# Patient Record
Sex: MALE | Race: WHITE | HISPANIC OR LATINO | Employment: FULL TIME | URBAN - NONMETROPOLITAN AREA
[De-identification: names, ages, dates, MRNs, and addresses within clinical notes are randomized per-mention and may not be internally consistent; named-entity substitution may affect disease eponyms.]

---

## 2023-05-12 ENCOUNTER — HOSPITAL ENCOUNTER (EMERGENCY)
Facility: HOSPITAL | Age: 28
Discharge: HOME | End: 2023-05-13
Attending: EMERGENCY MEDICINE
Payer: COMMERCIAL

## 2023-05-12 DIAGNOSIS — J03.90 EXUDATIVE TONSILLITIS: Primary | ICD-10-CM

## 2023-05-12 PROCEDURE — 99284 EMERGENCY DEPT VISIT MOD MDM: CPT

## 2023-05-12 PROCEDURE — 87081 CULTURE SCREEN ONLY: CPT | Performed by: EMERGENCY MEDICINE

## 2023-05-12 PROCEDURE — 87636 SARSCOV2 & INF A&B AMP PRB: CPT | Performed by: EMERGENCY MEDICINE

## 2023-05-12 PROCEDURE — 87880 STREP A ASSAY W/OPTIC: CPT | Performed by: EMERGENCY MEDICINE

## 2023-05-13 VITALS
DIASTOLIC BLOOD PRESSURE: 61 MMHG | WEIGHT: 204.2 LBS | HEART RATE: 124 BPM | TEMPERATURE: 101.4 F | OXYGEN SATURATION: 98 % | BODY MASS INDEX: 30.24 KG/M2 | HEIGHT: 69 IN | RESPIRATION RATE: 18 BRPM | SYSTOLIC BLOOD PRESSURE: 121 MMHG

## 2023-05-13 LAB
ALBUMIN SERPL-MCNC: 4 G/DL (ref 3.5–5.2)
ALBUMIN/GLOB SERPL: 1.4 G/DL
ALP SERPL-CCNC: 89 U/L (ref 39–117)
ALT SERPL W P-5'-P-CCNC: 20 U/L (ref 1–41)
ANION GAP SERPL CALCULATED.3IONS-SCNC: 12.7 MMOL/L (ref 5–15)
AST SERPL-CCNC: 23 U/L (ref 1–40)
BASOPHILS # BLD AUTO: 0.02 10*3/MM3 (ref 0–0.2)
BASOPHILS NFR BLD AUTO: 0.2 % (ref 0–1.5)
BILIRUB SERPL-MCNC: 0.5 MG/DL (ref 0–1.2)
BUN SERPL-MCNC: 12 MG/DL (ref 6–20)
BUN/CREAT SERPL: 11.3 (ref 7–25)
CALCIUM SPEC-SCNC: 8.5 MG/DL (ref 8.6–10.5)
CHLORIDE SERPL-SCNC: 101 MMOL/L (ref 98–107)
CO2 SERPL-SCNC: 22.3 MMOL/L (ref 22–29)
CREAT SERPL-MCNC: 1.06 MG/DL (ref 0.76–1.27)
DEPRECATED RDW RBC AUTO: 42.5 FL (ref 37–54)
EGFRCR SERPLBLD CKD-EPI 2021: 99.3 ML/MIN/1.73
EOSINOPHIL # BLD AUTO: 0.01 10*3/MM3 (ref 0–0.4)
EOSINOPHIL NFR BLD AUTO: 0.1 % (ref 0.3–6.2)
ERYTHROCYTE [DISTWIDTH] IN BLOOD BY AUTOMATED COUNT: 13 % (ref 12.3–15.4)
FLUAV SUBTYP SPEC NAA+PROBE: NOT DETECTED
FLUBV RNA ISLT QL NAA+PROBE: NOT DETECTED
GLOBULIN UR ELPH-MCNC: 2.9 GM/DL
GLUCOSE SERPL-MCNC: 129 MG/DL (ref 65–99)
HCT VFR BLD AUTO: 38.3 % (ref 37.5–51)
HETEROPH AB SER QL LA: NEGATIVE
HGB BLD-MCNC: 13.1 G/DL (ref 13–17.7)
IMM GRANULOCYTES # BLD AUTO: 0.02 10*3/MM3 (ref 0–0.05)
IMM GRANULOCYTES NFR BLD AUTO: 0.2 % (ref 0–0.5)
LYMPHOCYTES # BLD AUTO: 0.98 10*3/MM3 (ref 0.7–3.1)
LYMPHOCYTES NFR BLD AUTO: 9.8 % (ref 19.6–45.3)
MCH RBC QN AUTO: 30.6 PG (ref 26.6–33)
MCHC RBC AUTO-ENTMCNC: 34.2 G/DL (ref 31.5–35.7)
MCV RBC AUTO: 89.5 FL (ref 79–97)
MONOCYTES # BLD AUTO: 0.76 10*3/MM3 (ref 0.1–0.9)
MONOCYTES NFR BLD AUTO: 7.6 % (ref 5–12)
NEUTROPHILS NFR BLD AUTO: 8.23 10*3/MM3 (ref 1.7–7)
NEUTROPHILS NFR BLD AUTO: 82.1 % (ref 42.7–76)
NRBC BLD AUTO-RTO: 0 /100 WBC (ref 0–0.2)
PLATELET # BLD AUTO: 114 10*3/MM3 (ref 140–450)
PMV BLD AUTO: 11.2 FL (ref 6–12)
POTASSIUM SERPL-SCNC: 3.5 MMOL/L (ref 3.5–5.2)
PROT SERPL-MCNC: 6.9 G/DL (ref 6–8.5)
RBC # BLD AUTO: 4.28 10*6/MM3 (ref 4.14–5.8)
S PYO AG THROAT QL: NEGATIVE
SARS-COV-2 RNA RESP QL NAA+PROBE: NOT DETECTED
SODIUM SERPL-SCNC: 136 MMOL/L (ref 136–145)
WBC NRBC COR # BLD: 10.02 10*3/MM3 (ref 3.4–10.8)

## 2023-05-13 PROCEDURE — 80053 COMPREHEN METABOLIC PANEL: CPT | Performed by: PHYSICIAN ASSISTANT

## 2023-05-13 PROCEDURE — 85025 COMPLETE CBC W/AUTO DIFF WBC: CPT | Performed by: PHYSICIAN ASSISTANT

## 2023-05-13 PROCEDURE — 86308 HETEROPHILE ANTIBODY SCREEN: CPT | Performed by: PHYSICIAN ASSISTANT

## 2023-05-13 RX ORDER — CEPHALEXIN 500 MG/1
500 CAPSULE ORAL 4 TIMES DAILY
Qty: 28 CAPSULE | Refills: 0 | Status: SHIPPED | OUTPATIENT
Start: 2023-05-13 | End: 2023-05-20

## 2023-05-13 RX ORDER — IBUPROFEN 800 MG/1
800 TABLET ORAL ONCE
Status: COMPLETED | OUTPATIENT
Start: 2023-05-13 | End: 2023-05-13

## 2023-05-13 RX ORDER — DIPHENHYDRAMINE HYDROCHLORIDE AND LIDOCAINE HYDROCHLORIDE AND ALUMINUM HYDROXIDE AND MAGNESIUM HYDRO
5 KIT ONCE
Status: COMPLETED | OUTPATIENT
Start: 2023-05-13 | End: 2023-05-13

## 2023-05-13 RX ORDER — CEPHALEXIN 250 MG/1
500 CAPSULE ORAL ONCE
Status: COMPLETED | OUTPATIENT
Start: 2023-05-13 | End: 2023-05-13

## 2023-05-13 RX ORDER — ACETAMINOPHEN 500 MG
1000 TABLET ORAL ONCE
Status: DISCONTINUED | OUTPATIENT
Start: 2023-05-13 | End: 2023-05-13 | Stop reason: HOSPADM

## 2023-05-13 RX ORDER — ACETAMINOPHEN 500 MG
TABLET ORAL
Status: DISCONTINUED
Start: 2023-05-13 | End: 2023-05-13 | Stop reason: HOSPADM

## 2023-05-13 RX ADMIN — IBUPROFEN 800 MG: 800 TABLET ORAL at 00:42

## 2023-05-13 RX ADMIN — SODIUM CHLORIDE 1000 ML: 9 INJECTION, SOLUTION INTRAVENOUS at 00:37

## 2023-05-13 RX ADMIN — DIPHENHYDRAMINE HYDROCHLORIDE AND LIDOCAINE HYDROCHLORIDE AND ALUMINUM HYDROXIDE AND MAGNESIUM HYDRO 5 ML: KIT at 00:37

## 2023-05-13 RX ADMIN — CEPHALEXIN 500 MG: 250 CAPSULE ORAL at 01:37

## 2023-05-13 NOTE — DISCHARGE INSTRUCTIONS
Take antibiotic as prescribed. Follow up with your PCP for further outpatient evaluation if symptoms persist. Take Tylenol, 1g ever 4 hours, and Motrin 600mg every 6-8 hours as needed for fever and symptomatic treatment. Use over-the-counter chloraseptic spray per directions on the package. Drink plenty of fluids. Rest. Follow up with your PCP for further outpatient evaluation if symptoms persist. Return to the ER for new or worsening symptoms or acute concerns.

## 2023-05-13 NOTE — ED PROVIDER NOTES
"Subjective:  Chief Complaint:  Sore throat    History of Present Illness:  Patient is a 27-year-old male with no significant medical history presenting to the ER with complaints of fever, sore throat, and headache over the last 2 days.  Patient's significant other is at bedside and interpreted for the patient as he is [place]-speaking.  She states that his tonsils are huge and have what looks to be pus on them.  She states that they have had difficulty getting the fever under control.  She states she made him take a cool shower and he has had Tylenol and Motrin prior to arrival.  Motrin was given around 6 PM around 6 and half hours ago.  Tylenol was given 2 hours prior to arrival.  She denies any additional symptoms or complaints at this time.  Per nursing staff, family has been sick at home as well.  Patient significant other states they have a little girl at home.      Nurses Notes reviewed and agree, including vitals, allergies, social history and prior medical history.     REVIEW OF SYSTEMS: All systems reviewed and not pertinent unless noted.  Review of Systems   Constitutional: Positive for fever.   HENT: Positive for sore throat.    Neurological: Positive for headaches.   All other systems reviewed and are negative.      History reviewed. No pertinent past medical history.    Allergies:    Patient has no known allergies.      No past surgical history on file.      Social History     Socioeconomic History   • Marital status: Single         History reviewed. No pertinent family history.    Objective  Physical Exam:  /61 (BP Location: Left arm, Patient Position: Sitting)   Pulse (!) 124   Temp (!) 101.4 °F (38.6 °C) (Oral)   Resp 18   Ht 175.3 cm (69\")   Wt 92.6 kg (204 lb 3.2 oz)   SpO2 98%   BMI 30.16 kg/m²      Physical Exam  Vitals and nursing note reviewed.   Constitutional:       General: He is not in acute distress.     Appearance: He is not toxic-appearing.   HENT:      Head: Normocephalic " and atraumatic.      Right Ear: External ear normal.      Left Ear: External ear normal.      Nose: Nose normal.      Mouth/Throat:      Comments: Bilateral exudative tonsillitis, uvula midline, no signs of peritonsillar abscess  Eyes:      Extraocular Movements: Extraocular movements intact.      Conjunctiva/sclera: Conjunctivae normal.   Cardiovascular:      Rate and Rhythm: Tachycardia present.      Heart sounds: Normal heart sounds.   Pulmonary:      Effort: Pulmonary effort is normal. No respiratory distress.      Breath sounds: Normal breath sounds.   Abdominal:      General: There is no distension.      Palpations: Abdomen is soft.   Musculoskeletal:         General: Normal range of motion.      Cervical back: Normal range of motion and neck supple.   Skin:     General: Skin is warm and dry.   Neurological:      General: No focal deficit present.      Mental Status: He is alert and oriented to person, place, and time.   Psychiatric:         Mood and Affect: Mood normal.         Behavior: Behavior normal.         Procedures    ED Course:         Lab Results (last 24 hours)     Procedure Component Value Units Date/Time    COVID-19 and FLU A/B PCR - Swab, Nasopharynx [[id number]]  (Normal) Collected: 05/12/23 2357    Specimen: Swab from Nasopharynx Updated: 05/13/23 0030     COVID19 Not Detected     Influenza A PCR Not Detected     Influenza B PCR Not Detected    Narrative:      Fact sheet for providers: https://www.fda.gov/media/064872/download    Fact sheet for patients: https://www.fda.gov/media/243556/download    Test performed by PCR.    Rapid Strep A Screen - Swab, Throat [[id number]]  (Normal) Collected: 05/12/23 2357    Specimen: Swab from Throat Updated: 05/13/23 0007     Strep A Ag Negative    Beta Strep Culture, Throat - Swab, Throat [[id number]] Collected: 05/12/23 2357    Specimen: Swab from Throat Updated: 05/13/23 0007    Mononucleosis Screen [[id number]]  (Normal) Collected: 05/13/23 0037     Specimen: Blood Updated: 05/13/23 0124     Monospot Negative    CBC & Differential [[id number]]  (Abnormal) Collected: 05/13/23 0037    Specimen: Blood Updated: 05/13/23 0049    Narrative:      The following orders were created for panel order CBC & Differential.  Procedure                               Abnormality         Status                     ---------                               -----------         ------                     CBC Auto Differential[[id number]]        Abnormal            Final result               Scan Slide[[id number]]                                                                    Please view results for these tests on the individual orders.    Comprehensive Metabolic Panel [[id number]]  (Abnormal) Collected: 05/13/23 0037    Specimen: Blood Updated: 05/13/23 0058     Glucose 129 mg/dL      BUN 12 mg/dL      Creatinine 1.06 mg/dL      Sodium 136 mmol/L      Potassium 3.5 mmol/L      Chloride 101 mmol/L      CO2 22.3 mmol/L      Calcium 8.5 mg/dL      Total Protein 6.9 g/dL      Albumin 4.0 g/dL      ALT (SGPT) 20 U/L      AST (SGOT) 23 U/L      Alkaline Phosphatase 89 U/L      Total Bilirubin 0.5 mg/dL      Globulin 2.9 gm/dL      A/G Ratio 1.4 g/dL      BUN/Creatinine Ratio 11.3     Anion Gap 12.7 mmol/L      eGFR 99.3 mL/min/1.73     Narrative:      GFR Normal >60  Chronic Kidney Disease <60  Kidney Failure <15      CBC Auto Differential [[id number]]  (Abnormal) Collected: 05/13/23 0037    Specimen: Blood Updated: 05/13/23 0049     WBC 10.02 10*3/mm3      RBC 4.28 10*6/mm3      Hemoglobin 13.1 g/dL      Hematocrit 38.3 %      MCV 89.5 fL      MCH 30.6 pg      MCHC 34.2 g/dL      RDW 13.0 %      RDW-SD 42.5 fl      MPV 11.2 fL      Platelets 114 10*3/mm3      Neutrophil % 82.1 %      Lymphocyte % 9.8 %      Monocyte % 7.6 %      Eosinophil % 0.1 %      Basophil % 0.2 %      Immature Grans % 0.2 %      Neutrophils, Absolute 8.23 10*3/mm3      Lymphocytes, Absolute 0.98 10*3/mm3       Monocytes, Absolute 0.76 10*3/mm3      Eosinophils, Absolute 0.01 10*3/mm3      Basophils, Absolute 0.02 10*3/mm3      Immature Grans, Absolute 0.02 10*3/mm3      nRBC 0.0 /100 WBC            No radiology results from the last 24 hrs       MDM  Patient was evaluated in the ER for sore throat, headache, fever x2 days.  Patient is febrile and tachycardic upon arrival but otherwise hemodynamically stable nontoxic-appearing on exam.  Differential diagnosis includes was not limited to viral illness, bacterial pharyngitis, COVID/flu, strep pharyngitis, mononucleosis, among others.  Initial plan includes CBC, CMP, Monospot test, strep swab, COVID/flu swab, and treatment with Magic mouthwash and IV fluids as well as Motrin.    Labs are unremarkable.  Mono, COVID, flu, and strep are negative.  Throat culture pending.  Given that patient does have exudative tonsillitis, will treat with Keflex with first dose given in the ER.  Patient was advised on supportive care with Motrin, Tylenol, rest, fluids.  He was advised to follow-up with his PCP for further outpatient evaluation if symptoms persist.  Precautions were given for return to the ER for any new or worsening symptoms.    Final diagnoses:   Exudative tonsillitis        Arlette Knowles, STEVE  05/13/23 0132

## 2023-05-14 ENCOUNTER — HOSPITAL ENCOUNTER (EMERGENCY)
Facility: HOSPITAL | Age: 28
Discharge: HOME | End: 2023-05-14
Attending: EMERGENCY MEDICINE | Admitting: EMERGENCY MEDICINE
Payer: COMMERCIAL

## 2023-05-14 VITALS
BODY MASS INDEX: 30.21 KG/M2 | HEART RATE: 110 BPM | TEMPERATURE: 101.8 F | SYSTOLIC BLOOD PRESSURE: 112 MMHG | DIASTOLIC BLOOD PRESSURE: 77 MMHG | RESPIRATION RATE: 18 BRPM | OXYGEN SATURATION: 97 % | WEIGHT: 204 LBS | HEIGHT: 69 IN

## 2023-05-14 DIAGNOSIS — J03.90 EXUDATIVE TONSILLITIS: Primary | ICD-10-CM

## 2023-05-14 LAB — S PYO AG THROAT QL: NEGATIVE

## 2023-05-14 PROCEDURE — 99283 EMERGENCY DEPT VISIT LOW MDM: CPT

## 2023-05-14 PROCEDURE — 87880 STREP A ASSAY W/OPTIC: CPT | Performed by: EMERGENCY MEDICINE

## 2023-05-14 PROCEDURE — 87081 CULTURE SCREEN ONLY: CPT | Performed by: EMERGENCY MEDICINE

## 2023-05-14 RX ORDER — CEPHALEXIN 500 MG/1
500 CAPSULE ORAL 4 TIMES DAILY
Qty: 28 CAPSULE | Refills: 0 | Status: SHIPPED | OUTPATIENT
Start: 2023-05-14 | End: 2023-05-21

## 2023-05-14 RX ORDER — ACETAMINOPHEN 325 MG/1
650 TABLET ORAL ONCE
Status: COMPLETED | OUTPATIENT
Start: 2023-05-14 | End: 2023-05-14

## 2023-05-14 RX ADMIN — ACETAMINOPHEN 650 MG: 325 TABLET, FILM COATED ORAL at 09:23

## 2023-05-14 NOTE — ED PROVIDER NOTES
"Subjective  History of Present Illness:    Chief Complaint: Sore throat  History of Present Illness: 27-year-old male presents with a sore throat, seen in the emergency department yesterday, had blood work, strep swab, and tested for mononucleosis, all results negative, he was discharged home with Keflex for exudative tonsillitis, the pharmacy did not have the prescription although it was electronically sent.  Onset: Gradual onset  Duration: Several days  Exacerbating / Alleviating factors: Pain with swallowing  Associated symptoms: Fever      Nurses Notes reviewed and agree, including vitals, allergies, social history and prior medical history.     REVIEW OF SYSTEMS: All systems reviewed and not pertinent unless noted.    Review of Systems   HENT: Positive for sore throat.    All other systems reviewed and are negative.      History reviewed. No pertinent past medical history.    Allergies:    Patient has no known allergies.      History reviewed. No pertinent surgical history.      Social History     Socioeconomic History   • Marital status: Single         History reviewed. No pertinent family history.    Objective  Physical Exam:  /73 (BP Location: Left arm, Patient Position: Sitting)   Pulse 111   Temp (!) 101.8 °F (38.8 °C) (Oral)   Resp 22   Ht 175.3 cm (69\")   Wt 92.5 kg (204 lb)   SpO2 96%   BMI 30.13 kg/m²      Physical Exam  Vitals and nursing note reviewed.   Constitutional:       Appearance: He is well-developed.   HENT:      Head: Normocephalic and atraumatic.      Mouth/Throat:      Pharynx: Pharyngeal swelling and posterior oropharyngeal erythema present.      Tonsils: Tonsillar exudate present. No tonsillar abscesses. 2+ on the right. 2+ on the left.   Eyes:      Extraocular Movements: Extraocular movements intact.   Cardiovascular:      Rate and Rhythm: Regular rhythm. Tachycardia present.   Pulmonary:      Effort: Pulmonary effort is normal.      Breath sounds: Normal breath sounds. "   Abdominal:      Palpations: Abdomen is soft.   Musculoskeletal:         General: Normal range of motion.      Cervical back: Normal range of motion.   Skin:     General: Skin is warm and dry.   Neurological:      Mental Status: He is alert and oriented to person, place, and time.   Psychiatric:         Behavior: Behavior normal.         Thought Content: Thought content normal.         Judgment: Judgment normal.           Procedures    ED Course:         Lab Results (last 24 hours)     Procedure Component Value Units Date/Time    Rapid Strep A Screen - Swab, Throat [[id number]]  (Normal) Collected: 05/14/23 0848    Specimen: Swab from Throat Updated: 05/14/23 0910     Strep A Ag Negative    Beta Strep Culture, Throat - Swab, Throat [[id number]] Collected: 05/14/23 0848    Specimen: Swab from Throat Updated: 05/14/23 0909           No radiology results from the last 24 hrs       Medical Decision Making  27-year-old male presents with a sore throat, seen in the emergency department yesterday, had blood work, mono, and strep all negative.  He was sent Keflex to [facility] pharmacy, it was not reviewed electronically, I reevaluate the patient at the bedside, strep was again negative, but he does have exudative tonsillitis, I sent Keflex to [facility] for the patient, recommend Tylenol Motrin, continue fluids and antipyretics, return if symptoms worsen, cleared for discharge.    Exudative tonsillitis: complicated acute illness or injury  Risk  OTC drugs.  Prescription drug management.            Final diagnoses:   Exudative tonsillitis        Martin Simmons Jr., PACarringtonC  05/14/23 0918

## 2023-05-15 LAB — BACTERIA SPEC AEROBE CULT: NORMAL

## 2023-05-16 LAB — BACTERIA SPEC AEROBE CULT: NORMAL
